# Patient Record
Sex: MALE | Race: BLACK OR AFRICAN AMERICAN
[De-identification: names, ages, dates, MRNs, and addresses within clinical notes are randomized per-mention and may not be internally consistent; named-entity substitution may affect disease eponyms.]

---

## 2018-01-10 ENCOUNTER — HOSPITAL ENCOUNTER (EMERGENCY)
Dept: HOSPITAL 72 - EMR | Age: 33
Discharge: LEFT BEFORE DISCHARGE | End: 2018-01-10
Payer: MEDICAID

## 2018-01-10 VITALS — DIASTOLIC BLOOD PRESSURE: 56 MMHG | SYSTOLIC BLOOD PRESSURE: 113 MMHG

## 2018-01-10 VITALS — SYSTOLIC BLOOD PRESSURE: 123 MMHG | DIASTOLIC BLOOD PRESSURE: 61 MMHG

## 2018-01-10 VITALS — HEIGHT: 68 IN | WEIGHT: 140 LBS | BODY MASS INDEX: 21.22 KG/M2

## 2018-01-10 DIAGNOSIS — R10.9: Primary | ICD-10-CM

## 2018-01-10 DIAGNOSIS — Z53.21: ICD-10-CM

## 2018-01-10 LAB
APPEARANCE UR: CLEAR
APTT PPP: YELLOW S
GLUCOSE UR STRIP-MCNC: NEGATIVE MG/DL
KETONES UR QL STRIP: NEGATIVE
LEUKOCYTE ESTERASE UR QL STRIP: (no result)
NITRITE UR QL STRIP: NEGATIVE
PH UR STRIP: 6 [PH] (ref 4.5–8)
PROT UR QL STRIP: NEGATIVE
SP GR UR STRIP: 1.02 (ref 1–1.03)
UROBILINOGEN UR-MCNC: 1 MG/DL (ref 0–1)

## 2018-01-10 PROCEDURE — 87086 URINE CULTURE/COLONY COUNT: CPT

## 2018-01-10 PROCEDURE — 81003 URINALYSIS AUTO W/O SCOPE: CPT

## 2018-01-10 PROCEDURE — 99283 EMERGENCY DEPT VISIT LOW MDM: CPT

## 2018-01-10 NOTE — EMERGENCY ROOM REPORT
History of Present Illness


General


Chief Complaint:  Abdominal Pain


Source:  Patient





Present Illness


HPI


32-year-old male with no sig pmhx p/w abdominal pain for 4 hours. 


Patient states pain started suddenly, localized to mid abdomen and left lower 

quadrant, non radiating,  intermittent. No relieving or exacerbating factors. 


Pt reports n/v, 5 episodes of nbnb vomiting, no diarrhea or constipation


Denies fever, chills. 


No hx of abdominal surgeries.


No hx of endoscopies/colonoscopies.


Allergies:  


Coded Allergies:  


     No Known Allergies (Unverified , 1/10/18)





Patient History


Past Medical History:  see triage record


Past Surgical History:  none


Pertinent Family History:  none


Reviewed Nursing Documentation:  PMH: Agreed, PSxH: Agreed





Nursing Documentation-PMH


Past Medical History:  No Stated History





Review of Systems


All Other Systems:  negative except mentioned in HPI





Physical Exam





Vital Signs








  Date Time  Temp Pulse Resp B/P (MAP) Pulse Ox O2 Delivery O2 Flow Rate FiO2


 


1/10/18 21:17 98.1 58 16 113/56 98 Room Air  








Sp02 EP Interpretation:  reviewed, normal


General Appearance:  alert, GCS 15, non-toxic, moderate distress


Head:  normocephalic, atraumatic


Eyes:  bilateral eye normal inspection, bilateral eye PERRL, bilateral eye EOMI


ENT:  normal ENT inspection, normal pharynx, normal voice, moist mucus membranes


Neck:  normal inspection, full range of motion, supple


Respiratory:  normal inspection, lungs clear, normal breath sounds, no 

respiratory distress, no retraction, no wheezing, speaking full sentences, 

chest symmetrical


Cardiovascular #1:  normal inspection, regular rate, rhythm, no edema, normal 

capillary refill


Cardiovascular #2:  2+ radial (R), 2+ radial (L)


Gastrointestinal:  other - periumbilical tenderness and LLQ tenderness. mild 

guarding. no rebound


Genitourinary:  no CVA tenderness


Musculoskeletal:  normal inspection, back normal, normal range of motion, non-

tender


Neurologic:  normal inspection, alert, oriented x3, responsive, motor strength/

tone normal, sensory intact, normal gait, speech normal


Psychiatric:  normal inspection, judgement/insight normal, memory normal


Skin:  normal inspection, normal color, no rash, warm/dry, well hydrated, 

normal turgor





Medical Decision Making


Diagnostic Impression:  


 Primary Impression:  


 Abdominal pain


ER Course


32-year-old male with abdominal pain 





Differential Diagnosis:


Gastritis, gastroenteritis, cholecystitis, appendicitis, diverticulitis, UTI/

pyelo





Plan:


Basic labs, ua


Zofran, pain control, IVF








ER course:


Patient has remained stable during ED stay.


He has been walking around the emergency room


Patient was refusing lab work, states that he only wants pain medication and 

antibiotics. I gave him motrin. I told patient that I needed to perform blood 

work because I was concerned with intra-abdominal pathology such as 

appendicitis or diverticulitis given his exam.  He became extremely hostile, 

yelling at me, disrupting the emergency room.





He decided to leave AGAINST MEDICAL ADVICE





Disposition:


Patient is clinically sober, is free from from distracting injury, and has 

intact judgement and capacity to decide to leave against medical advice. 


Patient came in for abdominal pain. I'm concerned for intra-abdominal pathology 

such as gastritis, gastroenteritis, appendicitis, diverticulitis. 


Patient verbalized understanding of my concern and my need to do blood work and 

a CAT scan, but patient states "I just want pain medication and I don't want 

anyone to take my blood ". I explained to patient the risks of leaving AMA and 

patient informed that if they leave, they could get worse, ould become become 

critically ill, possibly become disabled or die. Patient verbalized back to me 

understanding of these risks but still wants to leave.








Please note that this Emergency Department Report was dictated using [employer] technology software, occasionally this can lead to 

erroneous entry secondary to interpretation by the dictation equipment





Last Vital Signs








  Date Time  Temp Pulse Resp B/P (MAP) Pulse Ox O2 Delivery O2 Flow Rate FiO2


 


1/10/18 21:17 98.1 58 16 113/56 98 Room Air  








Disposition:  AGAINST MEDICAL ADVICE


Condition:  Cristian Hendrickson M.D. Yosvany 10, 2018 21:47